# Patient Record
Sex: FEMALE | Race: WHITE
[De-identification: names, ages, dates, MRNs, and addresses within clinical notes are randomized per-mention and may not be internally consistent; named-entity substitution may affect disease eponyms.]

---

## 2018-01-14 ENCOUNTER — HOSPITAL ENCOUNTER (EMERGENCY)
Dept: HOSPITAL 25 - UCEAST | Age: 53
Discharge: HOME | End: 2018-01-14
Payer: COMMERCIAL

## 2018-01-14 VITALS — DIASTOLIC BLOOD PRESSURE: 59 MMHG | SYSTOLIC BLOOD PRESSURE: 117 MMHG

## 2018-01-14 DIAGNOSIS — H10.9: Primary | ICD-10-CM

## 2018-01-14 DIAGNOSIS — Z77.22: ICD-10-CM

## 2018-01-14 PROCEDURE — G0463 HOSPITAL OUTPT CLINIC VISIT: HCPCS

## 2018-01-14 PROCEDURE — 99212 OFFICE O/P EST SF 10 MIN: CPT

## 2018-01-14 NOTE — ED
Throat Pain/Nasal Congestion





- HPI Summary


HPI Summary: 





51 YO F, C/O LEFT EYE REDNESS/IRRITATION FOR SEVERAL DAYS.





- History of Current Complaint


Chief Complaint: UCEye


Time Seen by Provider: 01/14/18 13:49


Hx Obtained From: Patient


Onset/Duration: Lasting Days


Severity: Mild


Associated Signs And Symptoms: Positive: FB Sensation.  Negative: Sinus 

Discomfort, Nasal Discharge


Cough: None





- Epiglottits Risk Factors


Epiglottis Risk Factors: Negative





- Allergies/Home Medications


Allergies/Adverse Reactions: 


 Allergies











Allergy/AdvReac Type Severity Reaction Status Date / Time


 


Aspirin Allergy  Swelling Verified 01/14/18 13:37





   Of  





   Face,Lips,&  





   Throat  


 


Banana Allergy  HIVES IN Verified 01/14/18 13:37





   THROAT  











Home Medications: 


 Home Medications





Gentamicin 0.3% OPHTH.SOLN* 1 drop LEFT EYE Q4H 01/14/18 [History Confirmed 01/ 14/18]











PMH/Surg Hx/FS Hx/Imm Hx


Previously Healthy: Yes


GI History: Reports: Hx Gastroesophageal Reflux Disease - HEARTBURN- PRN ZANTAC 

FOR


Musculoskeletal History: Reports: Hx Arthritis - LOWER BACK


Sensory History: Reports: Hx Contacts or Glasses - GLASSES


   Denies: Hx Hearing Aid


Opthamlomology History: Reports: Hx Contacts or Glasses - GLASSES





- Cancer History


Hx Chemotherapy: No


Hx Radiation Therapy: No





- Surgical History


Surgery Procedure, Year, and Place: 1985-APPENDECTOMY.  AGE 8 SETTING OF A 

FRACTURED ARM.  WISDOME TEETH EXTRACTED-WHILE IN HER 20'S.  COLONOSCOPY AND 

[id number]


Hx Anesthesia Reactions: No


Infectious Disease History: No


Infectious Disease History: 


   Denies: Traveled Outside the [place] in Last 30 Days





- Social History


Alcohol Use: Occasionally


Substance Use Type: Reports: None


Substance Use Comment - Amount & Last Used: LAST TIME USED ABOUT 2 WEEKS AGO


Smoking Status (MU): Never Smoked Tobacco





Review of Systems


Constitutional: Negative


Positive: Drainage, Erythema, Other - NO KNOWN TRAUMA. FEELS LIKE POSSIBLE FB. 

HAS BEEN USING GENTAMYCIN DROPS; NOT IMPROVING.  Negative: Photophobia, Blurred 

Vision


Negative: Ear Ache, Nasal Discharge


Cardiovascular: Negative


Respiratory: Negative


Gastrointestinal: Negative


Genitourinary: Negative


Musculoskeletal: Negative


Skin: Negative


Neurological: Negative


Psychological: Normal


All Other Systems Reviewed And Are Negative: Yes





Physical Exam


Triage Information Reviewed: Yes


Vital Signs On Initial Exam: 


 Initial Vitals











Temp Pulse Resp BP Pulse Ox


 


 100.1 F   82   18   117/59   99 


 


 01/14/18 13:38  01/14/18 13:38  01/14/18 13:38  01/14/18 13:38  01/14/18 13:38











Vital Signs Reviewed: Yes


Appearance: Positive: Well-Appearing


Skin: Positive: Warm, Skin Color Reflects Adequate Perfusion


Head/Face: Positive: Normal Head/Face Inspection


Eyes: Positive: EOMI, KAT, Conjunctiva Inflammed - LEFT EYE WITH SCLERAL 

INJECTION. NO FB SEEN., Other: - FLUORESCEIN STAIN WITHOUT OBVIOUS UPTAKE/

ABRASION/FB. NO OBVIOUS STYE IN EYELIDS.


ENT: Positive: Normal ENT inspection


Neck: Positive: Supple


Respiratory/Lung Sounds: Positive: Clear to Auscultation


Cardiovascular: Positive: RRR


Musculoskeletal: Positive: Normal


Neurological: Positive: Normal


Psychiatric: Positive: Normal


AVPU Assessment: Alert





Diagnostics





- Vital Signs


 Vital Signs











  Temp Pulse Resp BP Pulse Ox


 


 01/14/18 13:38  100.1 F  82  18  117/59  99














- Laboratory


Lab Statement: Any lab studies that have been ordered have been reviewed, and 

results considered in the medical decision making process.





EENT Course/Dx





- Course


Course Of Treatment: NO OBVIOUS CAUSE FOUND FOR LEFT CONJUNCTIVITIS. NO FB/

ABRASION/STYE SEEN.  WILL CHANGE RX TO TOBRAMYCIN AND WILL F/U WITH 

OPHTHALMOLOGY TOMORROW IF NOT COMPLETELY IMPROVED.





- Diagnoses


Provider Diagnoses: 


 Left conjunctivitis








Discharge





- Discharge Plan


Condition: Stable


Disposition: HOME


Prescriptions: 


Tobramycin 0.3% OPHTH.SOL* 1 drop LEFT EYE Q4H #1 btl


Patient Education Materials:  Conjunctivitis (ED)


Referrals: 


Fercho Harding MD [Primary Care Provider] - 


Additional Instructions: 


FOLLOW UP WITH YOUR OPHTHALMOLOGIST TOMORROW IF NOT COMPLETELY IMPROVED.